# Patient Record
Sex: MALE | Race: ASIAN | Employment: STUDENT | URBAN - METROPOLITAN AREA
[De-identification: names, ages, dates, MRNs, and addresses within clinical notes are randomized per-mention and may not be internally consistent; named-entity substitution may affect disease eponyms.]

---

## 2019-05-16 ENCOUNTER — ANCILLARY PROCEDURE (OUTPATIENT)
Dept: GENERAL RADIOLOGY | Facility: CLINIC | Age: 24
End: 2019-05-16
Attending: PREVENTIVE MEDICINE
Payer: COMMERCIAL

## 2019-05-16 ENCOUNTER — OFFICE VISIT (OUTPATIENT)
Dept: ORTHOPEDICS | Facility: CLINIC | Age: 24
End: 2019-05-16
Payer: COMMERCIAL

## 2019-05-16 VITALS — HEART RATE: 63 BPM | DIASTOLIC BLOOD PRESSURE: 70 MMHG | SYSTOLIC BLOOD PRESSURE: 143 MMHG | HEIGHT: 73 IN

## 2019-05-16 DIAGNOSIS — M54.50 CHRONIC LEFT-SIDED LOW BACK PAIN WITHOUT SCIATICA: ICD-10-CM

## 2019-05-16 DIAGNOSIS — M62.830 LUMBAR PARASPINAL MUSCLE SPASM: ICD-10-CM

## 2019-05-16 DIAGNOSIS — M54.50 CHRONIC LEFT-SIDED LOW BACK PAIN WITHOUT SCIATICA: Primary | ICD-10-CM

## 2019-05-16 DIAGNOSIS — G89.29 CHRONIC LEFT-SIDED LOW BACK PAIN WITHOUT SCIATICA: Primary | ICD-10-CM

## 2019-05-16 DIAGNOSIS — G89.29 CHRONIC LEFT-SIDED LOW BACK PAIN WITHOUT SCIATICA: ICD-10-CM

## 2019-05-16 RX ORDER — MELOXICAM 15 MG/1
15 TABLET ORAL DAILY
Qty: 30 TABLET | Refills: 1 | Status: SHIPPED | OUTPATIENT
Start: 2019-05-16 | End: 2019-07-27

## 2019-05-16 NOTE — LETTER
Date:May 17, 2019      Patient was self referred, no letter generated. Do not send.        [facility] Health Information

## 2019-05-16 NOTE — PROGRESS NOTES
SPORTS & ORTHOPEDIC WALK-IN VISIT 5/16/2019    Primary Care Physician:      Left side low back pain for the last few years. Played basketball, thinks that may have caused it. Laying on left side feels better. More comfortable with left side back in a stretched position. States he was seen in [place], was told his back is cramped up. States he had a small procedure where needle was put in and muscle was loosened up. States it relieved a lot of his pain.     Reason for visit:     What part of your body is injured / painful?  left low back    What caused the injury /pain? Overuse injury from regular activity    How long ago did your injury occur or pain begin? problem is longstanding    What are your most bothersome symptoms? Pain    How would you characterize your symptom?  dull and sore    What makes your symptoms better? Nothing - doesn't do anything    What makes your symptoms worse? Other: laying on right side,     Have you been previously seen for this problem? Yes, had been seen in [place]    Medical History:    Any recent changes to your medical history? No    Any new medication prescribed since last visit? No    Have you had surgery on this body part before? Yes in [place], 3 years ago     Social History:    Occupation: student, HR    Handedness:     Exercise: 3-4 days/week    Review of Systems:    Do you have fever, chills, weight loss? No    Do you have any vision problems? Yes, wears contacts     Do you have any chest pain or edema? No    Do you have any shortness of breath or wheezing?  No    Do you have stomach problems? No    Do you have any numbness or focal weakness? No    Do you have diabetes? No    Do you have problems with bleeding or clotting? No    Do you have an rashes or other skin lesions? No

## 2019-05-16 NOTE — PROGRESS NOTES
"HISTORY OF PRESENT ILLNESS  Mr. Ospina is a pleasant 23 year old year old male who presents to clinic today with left sided low back pain  Has been going on/off for several years   Has had some PT treatments in the past  Has not taken regular medications for pain  Has pain chronically and daily   In the left low back  No symptoms into butt or legs  No numbness or tingling    MEDICAL HISTORY  There is no problem list on file for this patient.      No current outpatient medications on file.       No Known Allergies    No family history on file.    Additional medical/Social/Surgical histories reviewed in [facility] and updated as appropriate.     REVIEW OF SYSTEMS (5/16/2019)  10 point ROS of systems including Constitutional, Eyes, Respiratory, Cardiovascular, Gastroenterology, Genitourinary, Integumentary, Musculoskeletal, Psychiatric were all negative except for pertinent positives noted in my HPI.     PHYSICAL EXAM  Vitals:    05/16/19 1533   BP: 143/70   Pulse: 63   Height: 1.854 m (6' 1\")     Vital Signs: /70   Pulse 63   Ht 1.854 m (6' 1\")  Patient declined being weighed. There is no height or weight on file to calculate BMI.    General  - normal appearance, in no obvious distress  CV  - normal peripheral perfusion  Pulm  - normal respiratory pattern, non-labored  Musculoskeletal - lumbar spine  - stance: normal gait without limp, no obvious leg length discrepancy, normal heel and toe walk  - inspection: normal bone and joint alignment, no obvious scoliosis  - palpation: no paravertebral or bony tenderness  - ROM: flexion does exacerbates some left sided low back pain, normal extension, sidebending, rotation  - strength: lower extremities 5/5 in all planes  - special tests:  (-) straight leg raise  (-) slump test  Neuro  - patellar and Achilles DTRs 2+ bilaterally, no  lower extremity sensory deficit throughout L5 distribution, grossly normal coordination, normal muscle tone  Skin  - no ecchymosis, erythema, " warmth, or induration, no obvious rash  Psych  - interactive, appropriate, normal mood and affect    ASSESSMENT & PLAN  22 yo male with left lumbar spasms  Reviewed xrays: shows no significant abnormalities  mobic PRN  Given HEP   Consider PT  F/u in 1 month      Kali Pena MD, CAQSM

## 2019-05-16 NOTE — LETTER
5/16/2019       RE: Alek Ospina  [address]  [address]  [facility] [address]     Dear Colleague,    Thank you for referring your patient, Alek Ospina, to the [facility] SPORTS AND ORTHOPAEDIC WALK IN CLINIC at [facility]. Please see a copy of my visit note below.          SPORTS & ORTHOPEDIC WALK-IN VISIT 5/16/2019    Primary Care Physician:      Left side low back pain for the last few years. Played basketball, thinks that may have caused it. Laying on left side feels better. More comfortable with left side back in a stretched position. States he was seen in [place], was told his back is cramped up. States he had a small procedure where needle was put in and muscle was loosened up. States it relieved a lot of his pain.     Reason for visit:     What part of your body is injured / painful?  left low back    What caused the injury /pain? Overuse injury from regular activity    How long ago did your injury occur or pain begin? problem is longstanding    What are your most bothersome symptoms? Pain    How would you characterize your symptom?  dull and sore    What makes your symptoms better? Nothing - doesn't do anything    What makes your symptoms worse? Other: laying on right side,     Have you been previously seen for this problem? Yes, had been seen in [place]    Medical History:    Any recent changes to your medical history? No    Any new medication prescribed since last visit? No    Have you had surgery on this body part before? Yes in [place], 3 years ago     Social History:    Occupation: student, HR    Handedness:     Exercise: 3-4 days/week    Review of Systems:    Do you have fever, chills, weight loss? No    Do you have any vision problems? Yes, wears contacts     Do you have any chest pain or edema? No    Do you have any shortness of breath or wheezing?  No    Do you have stomach problems? No    Do you have any numbness or focal weakness? No    Do you have diabetes? No    Do  "you have problems with bleeding or clotting? No    Do you have an rashes or other skin lesions? No           HISTORY OF PRESENT ILLNESS  Mr. Ospina is a pleasant 23 year old year old male who presents to clinic today with left sided low back pain  Has been going on/off for several years   Has had some PT treatments in the past  Has not taken regular medications for pain  Has pain chronically and daily   In the left low back  No symptoms into butt or legs  No numbness or tingling    MEDICAL HISTORY  There is no problem list on file for this patient.      No current outpatient medications on file.       No Known Allergies    No family history on file.    Additional medical/Social/Surgical histories reviewed in [facility] and updated as appropriate.     REVIEW OF SYSTEMS (5/16/2019)  10 point ROS of systems including Constitutional, Eyes, Respiratory, Cardiovascular, Gastroenterology, Genitourinary, Integumentary, Musculoskeletal, Psychiatric were all negative except for pertinent positives noted in my HPI.     PHYSICAL EXAM  Vitals:    05/16/19 1533   BP: 143/70   Pulse: 63   Height: 1.854 m (6' 1\")     Vital Signs: /70   Pulse 63   Ht 1.854 m (6' 1\")  Patient declined being weighed. There is no height or weight on file to calculate BMI.    General  - normal appearance, in no obvious distress  CV  - normal peripheral perfusion  Pulm  - normal respiratory pattern, non-labored  Musculoskeletal - lumbar spine  - stance: normal gait without limp, no obvious leg length discrepancy, normal heel and toe walk  - inspection: normal bone and joint alignment, no obvious scoliosis  - palpation: no paravertebral or bony tenderness  - ROM: flexion does exacerbates some left sided low back pain, normal extension, sidebending, rotation  - strength: lower extremities 5/5 in all planes  - special tests:  (-) straight leg raise  (-) slump test  Neuro  - patellar and Achilles DTRs 2+ bilaterally, no  lower extremity sensory deficit " throughout L5 distribution, grossly normal coordination, normal muscle tone  Skin  - no ecchymosis, erythema, warmth, or induration, no obvious rash  Psych  - interactive, appropriate, normal mood and affect    ASSESSMENT & PLAN  22 yo male with left lumbar spasms  Reviewed xrays: shows no significant abnormalities  mobic PRN  Given HEP   Consider PT  F/u in 1 month      Kali Pena MD, CAM    Again, thank you for allowing me to participate in the care of your patient.      Sincerely,    Kali Pena MD

## 2019-07-27 ENCOUNTER — HOSPITAL ENCOUNTER (EMERGENCY)
Facility: CLINIC | Age: 24
Discharge: HOME | End: 2019-07-27
Payer: COMMERCIAL

## 2019-07-27 ENCOUNTER — NURSE TRIAGE (OUTPATIENT)
Dept: NURSING | Facility: CLINIC | Age: 24
End: 2019-07-27

## 2019-07-27 VITALS
HEART RATE: 60 BPM | HEIGHT: 73 IN | WEIGHT: 165 LBS | DIASTOLIC BLOOD PRESSURE: 82 MMHG | RESPIRATION RATE: 16 BRPM | SYSTOLIC BLOOD PRESSURE: 138 MMHG | TEMPERATURE: 98.7 F | BODY MASS INDEX: 21.87 KG/M2 | OXYGEN SATURATION: 97 %

## 2019-07-27 ASSESSMENT — MIFFLIN-ST. JEOR: SCORE: 1797.32

## 2019-07-27 NOTE — ED TRIAGE NOTES
Headache 2 days  Describes as throbbing, mid-posterior  Came on suddenly sitting in his office at work. States he had to stop what he was doing due to the pain  Denies any recent travel, changes in foods or habits to bring on HA  Able to perform normal tasks  About the same intensity since onset  Denies vision changes, dizziness, n/v    First headache of his life  States he took pain medicine (SULY, bought in [place]) and did relieve the pain    No active meds, no PMH  Pt awake and alert, NAD

## 2019-07-27 NOTE — TELEPHONE ENCOUNTER
"Alek is having a severe headache for the past two days.  Denies fever and no vomiting.  Denies numbness.  Alek states that he has tried pain medication and the headache keeps coming back.      Reason for Disposition    [1] SEVERE headache (e.g., excruciating) AND [2] not improved after 2 hours of pain medicine    Additional Information    Negative: Difficult to awaken or acting confused (e.g., disoriented, slurred speech)    Negative: [1] Weakness of the face, arm or leg on one side of the body AND [2] new onset    Negative: [1] Numbness of the face, arm or leg on one side of the body AND [2] new onset    Negative: [1] Loss of speech or garbled speech AND [2] new onset    Negative: Passed out (i.e., lost consciousness, collapsed and was not responding)    Negative: Sounds like a life-threatening emergency to the triager    Negative: Unable to walk, or can only walk with assistance (e.g., requires support)    Negative: Stiff neck (can't touch chin to chest)    Negative: Severe pain in one eye    Negative: [1] Other family members (or roommates) with headaches AND [2] possibility of carbon monoxide exposure    Negative: [1] SEVERE headache (e.g., excruciating) AND [2] \"worst headache\" of life    Negative: [1] SEVERE headache AND [2] sudden-onset (i.e., reaching maximum intensity within seconds)    Negative: [1] SEVERE headache AND [2] fever    Negative: Loss of vision or double vision (Exception: same as prior migraines)    Negative: [1] Fever > 100.0 F (37.8 C) AND [2] diabetes mellitus or weak immune system (e.g., HIV positive, cancer chemo, splenectomy, chronic steroids)    Negative: Patient sounds very sick or weak to the triager    Protocols used: HEADACHE-A-AH      "